# Patient Record
Sex: MALE | Race: WHITE | NOT HISPANIC OR LATINO | Employment: FULL TIME | URBAN - METROPOLITAN AREA
[De-identification: names, ages, dates, MRNs, and addresses within clinical notes are randomized per-mention and may not be internally consistent; named-entity substitution may affect disease eponyms.]

---

## 2021-10-20 ENCOUNTER — HOSPITAL ENCOUNTER (EMERGENCY)
Facility: HOSPITAL | Age: 36
Discharge: HOME | End: 2021-10-20
Attending: EMERGENCY MEDICINE
Payer: COMMERCIAL

## 2021-10-20 VITALS
DIASTOLIC BLOOD PRESSURE: 92 MMHG | HEART RATE: 96 BPM | WEIGHT: 205 LBS | TEMPERATURE: 99 F | BODY MASS INDEX: 27.8 KG/M2 | SYSTOLIC BLOOD PRESSURE: 166 MMHG | RESPIRATION RATE: 18 BRPM | OXYGEN SATURATION: 98 %
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
## 2021-10-20 PROCEDURE — 99282 EMERGENCY DEPT VISIT SF MDM: CPT | Performed by: EMERGENCY MEDICINE

## 2021-10-20 PROCEDURE — 99284 EMERGENCY DEPT VISIT MOD MDM: CPT

## 2023-04-24 ENCOUNTER — OFFICE VISIT (OUTPATIENT)
Dept: PHYSICAL THERAPY | Age: 38
End: 2023-04-24

## 2023-04-24 DIAGNOSIS — M51.27 HERNIATION OF INTERVERTEBRAL DISC BETWEEN L5 AND S1: ICD-10-CM

## 2023-04-24 DIAGNOSIS — M54.17 LUMBOSACRAL RADICULOPATHY AT S1: Primary | ICD-10-CM

## 2023-04-24 NOTE — PROGRESS NOTES
Daily Note     Today's date: 2023  Patient name: Elena Saab  : 1985  MRN: [id number]  Referring provider: Kayy Koroma MD  Dx:   Encounter Diagnosis     ICD-10-CM    1  Lumbosacral radiculopathy at S1  M54 17       2  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  M51 27                      Subjective: Patient reported lumbar spine pain is eliminated with repeated lumbar spine extension  Patient noted right medial foot paresthesias persist, but only in the L 5 dermatome  Objective: See treatment diary below  Assessment: Tolerated treatment well  Patient exhibited good technique with therapeutic exercises  Patient presents with repeated lumbar spine extension in prone as an effective pain elimination but not reducing paresthesias  But, he does present with walking improved, as well as self iadls since onset of PT and obtaining a sit to stand desktop at work  But, flexion based functional activities sustained or repeated remain producing right distal L 5 foot lateral foot region symptoms and symptoms aggravation  Thus, PT will continue to have patient perform repeated lumbar spine extension in prone with self over pressure with lock and sag technique  Patient did not have any right distal symptom reduction with use of self lumbar spine extension with out and with over pressure as well as clinician over pressure  Thus, reassessment repeated lumbar spine hep on next visit and continue with force progression in prone as well as reassess standing lumbar spine extension  Plan: Continue per plan of care  Precautions: Patient's PMHx is remarkable for GERD and Hearing loss        Manuals            Lumbar spine extension mobilization in prone grade 1&2  10 min                                                  Neuro Re-Ed                                                                                                        Ther Ex                          Treadmill walking  10 min                        Hamstring stretch:B:  20 sec x 5           LTR:B:  10 sec x 5           Piriformis stretch:B:  10 sec x 5           DLS abdominal strengthening in hook lying  NT           DLS abdominal strengthening in hook lying with hip flexion:B:  NT           DLS abdominal strengthening in hook lying with slr flexion:B:  NT           DLS opposite upper extremity to lower extremity  NT           Bridges  NT                        Prone lying 2 min hep 2 min           Prone press ups 2 x 10 hep 2 x 10            Prone press ups with lock and sag 10 x hep 2 x 10           Prone press ups with clinician over pressure  5 x 2           Mini squats with DLS             Lunges with DLS:B:                          [place] rows and extension:B:                          [employer]                                                                 Ther Activity                                       Gait Training                                       Modalities             MHP and TENS to lumbar spine while prone prn  CP to lumbar spine prone with two pillows under chest x 10 min

## 2023-04-26 ENCOUNTER — OFFICE VISIT (OUTPATIENT)
Dept: PHYSICAL THERAPY | Age: 38
End: 2023-04-26

## 2023-04-26 DIAGNOSIS — M51.27 HERNIATION OF INTERVERTEBRAL DISC BETWEEN L5 AND S1: Primary | ICD-10-CM

## 2023-04-26 DIAGNOSIS — M54.17 LUMBOSACRAL RADICULOPATHY AT S1: ICD-10-CM

## 2023-04-26 NOTE — PROGRESS NOTES
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  M51 27       2  Lumbosacral radiculopathy at S1  M54 17                      Subjective: \" I am feeling about the same, the numbness on my foot is a bit less\"       Objective: See treatment diary below  Assessment: Increased symptoms at R foot with increased LB extension ROM  Prone elevated with CP provides decreased R foot numbness  HEP prone and prone with elevation at mid range  Plan: Continue per plan of care  Precautions: Patient's PMHx is remarkable for GERD and Hearing loss        Manuals           Lumbar spine extension mobilization in prone grade 1&2  10 min 10 min                        Manual traction   Prone                           Neuro Re-Ed                                                                                                        Ther Ex                          Treadmill walking  10 min 10 min 2 0 to 2 5 mph                        R LE 90/90 floss    10x 2           Hamstring stretch:B:  20 sec x 5 20sec 5x           LTR:B:  10 sec x 5 NT                                    Prone side bend   2 min           Piriformis stretch:B:  10 sec x 5 20SEC 5X           DLS abdominal strengthening in hook lying  NT NT          DLS abdominal strengthening in hook lying with hip flexion:B:  NT NT          DLS abdominal strengthening in hook lying with slr flexion:B:  NT NT          DLS opposite upper extremity to lower extremity  NT NT          Bridges  NT NT                       Prone lying 2 min hep 2 min 2 MIN X2           Prone press ups 2 x 10 hep 2 x 10  10X 5          Prone press ups with lock and sag 10 x hep 2 x 10 10X 2          Prone press ups with clinician over pressure  5 x 2 NT          Mini squats with DLS   NT       " Lunges with DLS:B:   NT                       [place] rows and extension:B:   NT                       [employer]   NT                                                              Ther Activity                                       Gait Training                                       Modalities             MHP and TENS to lumbar spine while prone prn  CP to lumbar spine prone with two pillows under chest x 10 min With   CP     Prone with   Blue wedge   10 min

## 2023-05-01 ENCOUNTER — OFFICE VISIT (OUTPATIENT)
Dept: PHYSICAL THERAPY | Age: 38
End: 2023-05-01

## 2023-05-01 DIAGNOSIS — M51.27 HERNIATION OF INTERVERTEBRAL DISC BETWEEN L5 AND S1: Primary | ICD-10-CM

## 2023-05-01 DIAGNOSIS — M54.17 LUMBOSACRAL RADICULOPATHY AT S1: ICD-10-CM

## 2023-05-01 NOTE — PROGRESS NOTES
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  M51 27       2  Lumbosacral radiculopathy at S1  M54 17                      Subjective: Pt noted increased LB centralized pain and R toe nimbleness       Objective: See treatment diary below  1:1 Muriel Walker DPT       Assessment: No change in symptoms  Trial of TENS and MHP with no change in symptoms today        Plan: Continue per plan of care  Precautions: Patient's PMHx is remarkable for GERD and Hearing loss        Manuals          Lumbar spine extension mobilization in prone grade 1&2  10 min 10 min  NT                      TRIAL  Rot mob                                        Manual traction   Prone                           Neuro Re-Ed                                       Ther Ex                          Treadmill walking  10 min 10 min 2 0 to 2 5 mph  10 min                      R LE 90/90 floss    10x 2  20X          Hamstring stretch:B:  20 sec x 5 20sec 5x  NT         LTR:B:  10 sec x 5 NT NT                      Prone sidebend lying                          Side glides                           Prone side bend   2 min  NT         Piriformis stretch:B:  10 sec x 5 20SEC 5X  20SEC 5X          DLS abdominal strengthening in hook lying  NT NT NT         DLS abdominal strengthening in hook lying with hip flexion:B:  NT NT 20X         DLS abdominal strengthening in hook lying with slr flexion:B:  NT NT 20X         DLS opposite upper extremity to lower extremity  NT NT NT         Bridges  NT NT 20X                                                              Prone lying 2 min hep 2 min 2 MIN X2  3 MIN   3X          Prone with 1 pillow     2 MIN 2X                       Prone on wedge     NT                      Prone press ups 2 x 10 hep 2 x 10 10X 5 NT         Prone press ups with lock and sag 10 x hep 2 x 10 10X 2 NT         Prone press ups with clinician over pressure  5 x 2 NT NT         Mini squats with DLS   NT NT         Lunges with DLS:B:   NT NT                      [place] rows and extension:B:   NT NT                      [place] [employer]   NT NT                                                             Ther Activity                                       Gait Training                                       Modalities             MHP and TENS to lumbar spine while prone prn  CP to lumbar spine prone with two pillows under chest x 10 min With   CP     Prone with   Blue wedge   10 min  TENS   MHP    Prone   Laying     10 min

## 2023-05-03 ENCOUNTER — OFFICE VISIT (OUTPATIENT)
Dept: PHYSICAL THERAPY | Age: 38
End: 2023-05-03

## 2023-05-03 DIAGNOSIS — M51.27 HERNIATION OF INTERVERTEBRAL DISC BETWEEN L5 AND S1: Primary | ICD-10-CM

## 2023-05-03 DIAGNOSIS — M54.17 LUMBOSACRAL RADICULOPATHY AT S1: ICD-10-CM

## 2023-05-03 NOTE — PROGRESS NOTES
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  M51 27       2  Lumbosacral radiculopathy at S1  M54 17                      Subjective: Patient reported right lumbar spine pinching type pain and right dorsal superior 5 th ray region numbleness       Objective: See treatment diary below  Assessment: Patient presents with short term lumbar spine pain elimination but he lacks right foot paresthesia elimination  Patient's directional preference is repeated lumbar spine extension in lying, but lack of full extension mobility limits prolonged pain resolution and functional progress  Thus, PT is warranted to facilitate long term pain resolution and functional progress  Plan: Continue per plan of care  Precautions: Patient's PMHx is remarkable for GERD and Hearing loss        Manuals 4/19 4/24 4/26 5/1 5/3        Lumbar spine extension mobilization in prone grade 1&2  10 min 10 min  NT 10 min                     TRIAL  Rot mob                                        Manual traction   Prone                           Neuro Re-Ed                                       Ther Ex                          Treadmill walking  10 min 10 min 2 0 to 2 5 mph  10 min 10 min                     R LE 90/90 floss    10x 2  20X          Hamstring stretch:B:  20 sec x 5 20sec 5x  NT 10 sec x 5        LTR:B:  10 sec x 5 NT NT 10 sec x 5                     Prone sidebend lying                          Side glides                           Prone side bend   2 min  NT         Piriformis stretch:B:  10 sec x 5 20SEC 5X  20SEC 5X  10 sec x 5        DLS abdominal strengthening in hook lying  NT NT NT NT        DLS abdominal strengthening in hook lying with hip flexion:B:  NT NT 20X 2 x 10        DLS abdominal strengthening in hook lying with slr flexion:B:  NT NT 20X NT        DLS opposite upper extremity to lower extremity  NT NT NT NT        Bridges  NT NT 20X  2 x 10                                                            Prone lying 2 min hep 2 min 2 MIN X2  3 MIN   3X  3 min with 1 pillow        Prone with 1 pillow     2 MIN 2X  2 min with 2 pillow and 2 min with 3 pillows                     Prone on wedge     NT NT                     Prone press ups 2 x 10 hep 2 x 10  10X 5 NT 10 x 5        Prone press ups with lock and sag 10 x hep 2 x 10 10X 2 NT NT        Prone press ups with clinician over pressure  5 x 2 NT NT NT        Mini squats with DLS   NT NT NT        Lunges with DLS:B:   NT NT NT                     [place] rows and extension:B:   NT NT NT                     [employer]   NT NT NT                                                            Ther Activity                                       Gait Training                                       Modalities             MHP and TENS to lumbar spine while prone prn  CP to lumbar spine prone with two pillows under chest x 10 min With   CP     Prone with   Blue wedge   10 min  TENS   MHP    Prone   Laying     10 min  deferred

## 2023-05-08 ENCOUNTER — OFFICE VISIT (OUTPATIENT)
Dept: PHYSICAL THERAPY | Age: 38
End: 2023-05-08

## 2023-05-08 DIAGNOSIS — M54.17 LUMBOSACRAL RADICULOPATHY AT S1: ICD-10-CM

## 2023-05-08 DIAGNOSIS — M51.27 HERNIATION OF INTERVERTEBRAL DISC BETWEEN L5 AND S1: Primary | ICD-10-CM

## 2023-05-08 NOTE — PROGRESS NOTES
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 on the same day without pinching sensation  Patient reported prolonged sitting at work will aggravate right distal lateral paresthesias  Objective: See treatment diary below  Assessment: Patient presents with short term lumbar spine pain elimination with repeated lumbar spine extension with and without over pressure  Patient continues to exhibit lack of right foot paresthesia elimination with piriformis stretching today creating right foot paresthesias aggravation  Thus, PT is warranted to facilitate long term pain resolution and functional progress  Plan: Continue per plan of care  Precautions: Patient's PMHx is remarkable for GERD and Hearing loss        Manuals 4/19 4/24 4/26 5/1 5/3 5/8       Lumbar spine extension mobilization in prone grade 1&2  10 min 10 min  NT 10 min 10 min continue                   TRIAL  Rot mob                                        Manual traction   Prone                           Neuro Re-Ed                                       Ther Ex                          Treadmill walking  10 min 10 min 2 0 to 2 5 mph  10 min 10 min                     R LE 90/90 floss    10x 2  20X          Hamstring stretch:B:  20 sec x 5 20sec 5x  NT 10 sec x 5 10 sec x 5       LTR:B:  10 sec x 5 NT NT 10 sec x 5 10 sec x 5                    Prone sidebend lying                          Side glides                           Prone side bend   2 min  NT         Piriformis stretch:B:  10 sec x 5 20SEC 5X  20SEC 5X  10 sec x 5 10 sec x 5       DLS abdominal strengthening in hook lying  NT NT NT NT NT       DLS abdominal strengthening in hook lying with hip flexion:B:  NT NT 20X 2 x 10 NT       DLS abdominal strengthening in hook lying with slr flexion:B:  NT NT 20X NT NT       DLS opposite upper extremity to lower extremity  NT NT NT NT NT       Bridges  NT NT 20X  2 x 10 2 x 10                                                           Prone lying 2 min hep 2 min 2 MIN X2  3 MIN   3X  3 min with 1 pillow 3 min with 1 pillow, 3 min without pillow       Prone with 1 pillow     2 MIN 2X  2 min with 2 pillow and 2 min with 3 pillows                     Prone on wedge     NT NT NT                    Prone press ups 2 x 10 hep 2 x 10  10X 5 NT 10 x 5 10 x pre and post treatment session       Prone press ups with lock and sag 10 x hep 2 x 10 10X 2 NT NT 10 x 2 pre and post PT treatment session       Prone press ups with clinician over pressure  5 x 2 NT NT NT  assess      Mini squats with DLS   NT NT NT NT       Lunges with DLS:B:   NT NT NT NT                    [place] rows and extension:B:   NT NT NT NT                    [place] [employer]   NT NT NT NT                                                           Ther Activity                                       Gait Training                                       Modalities             MHP and TENS to lumbar spine while prone prn  CP to lumbar spine prone with two pillows under chest x 10 min With   CP     Prone with   Blue wedge   10 min  TENS   MHP    Prone   Laying     10 min  deferred   CP x 10 min prone

## 2023-05-10 ENCOUNTER — OFFICE VISIT (OUTPATIENT)
Dept: PHYSICAL THERAPY | Age: 38
End: 2023-05-10

## 2023-05-10 DIAGNOSIS — M54.17 LUMBOSACRAL RADICULOPATHY AT S1: ICD-10-CM

## 2023-05-10 DIAGNOSIS — M51.27 HERNIATION OF INTERVERTEBRAL DISC BETWEEN L5 AND S1: Primary | ICD-10-CM

## 2023-05-10 NOTE — PROGRESS NOTES
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  M51 27       2  Lumbosacral radiculopathy at S1  M54 17                      Subjective: Pt reported 1-2/10 LB \"R foot pinky toe numbness the same\"       Objective: See treatment diary below  Assessment: LB \"pinching\" with  and loc and sag increased R foot toe numbness and tingling  TENS and CP Provided relief of overall symptoms  Plan: Continue per plan of care  Precautions: Patient's PMHx is remarkable for GERD and Hearing loss        Manuals 4/19 4/24 4/26 5/1 5/3 5/8 5/10      Lumbar spine extension mobilization in prone grade 1&2  10 min 10 min  NT 10 min 10 min  10 min                    TRIAL  Rot mob                           Manual traction   Prone                           Neuro Re-Ed                          Ther Ex                          Treadmill walking  10 min 10 min 2 0 to 2 5 mph  10 min 10 min 10 min      10 MIN                            R LE 90/90 floss    10x 2  20X     20X       Hamstring stretch:B:  20 sec x 5 20sec 5x  NT 10 sec x 5  HOLD      LTR:B:  10 sec x 5 NT NT 10 sec x 5  20X 3SEC                    Prone sidebend lying       NT                   Side glides        NT                   Prone side bend   2 min  NT   NT      Piriformis stretch:B:  10 sec x 5 20SEC 5X  20SEC 5X  10 sec x 5 10 sec x 5 20SEC 5X       DLS abdominal strengthening in hook lying  NT NT NT NT NT 20X       DLS abdominal strengthening in hook lying with hip flexion:B:  NT NT 20X 2 x 10 NT 20X       DLS abdominal strengthening in hook lying with slr flexion:B:  NT NT 20X NT NT NT      DLS opposite upper extremity to lower extremity  NT NT NT NT NT NT      Bridges  NT NT 20X  2 x 10 2 x 10 20x                                                           Prone " lying 2 min hep 2 min 2 MIN X2  3 MIN   3X  3 min with 1 pillow 3 min with 1 pillow, 3 min without pillow 3 MIN     1 PILLOW       Prone with 1 pillow     2 MIN 2X  2 min with 2 pillow and 2 min with 3 pillows  2 MIN     No pillow                    Prone on wedge     NT NT NT NT                   Prone press ups 2 x 10 hep 2 x 10  10X 5 NT 10 x 5 10 x pre and post treatment session 20X       Prone press ups with lock and sag 10 x hep 2 x 10 10X 2 NT NT 10 x 2 pre and post PT treatment session 20X       Prone press ups with clinician over pressure  5 x 2 NT NT NT  assess      Mini squats with DLS   NT NT NT NT NT      Lunges with DLS:B:   NT NT NT NT NT                   [place] rows and extension:B:   NT NT NT NT NT                   [employer]   NT NT NT NT NT                                                          Ther Activity                                       Gait Training                                       Modalities             MHP and TENS to lumbar spine while prone prn  CP to lumbar spine prone with two pillows under chest x 10 min With   CP     Prone with   Blue wedge   10 min  TENS   MHP    Prone   Laying     10 min  deferred   CP x 10 min prone CP   And TENS     To LB   Prone

## 2023-05-15 ENCOUNTER — OFFICE VISIT (OUTPATIENT)
Dept: PHYSICAL THERAPY | Age: 38
End: 2023-05-15

## 2023-05-15 DIAGNOSIS — M54.17 LUMBOSACRAL RADICULOPATHY AT S1: ICD-10-CM

## 2023-05-15 DIAGNOSIS — M51.27 HERNIATION OF INTERVERTEBRAL DISC BETWEEN L5 AND S1: Primary | ICD-10-CM

## 2023-05-15 NOTE — LETTER
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  M51 27       2  Lumbosacral radiculopathy at S1  M54 17           Dear Dr Corinna Sullivan: Thank you for your recent referral of Everardo Garcia  Please review the attached evaluation summary from Horacio's recent visit  Please verify that you agree with the plan of care by signing the attached order  If you have any questions or concerns, please do not hesitate to call  I sincerely appreciate the opportunity to share in the care of one of your patients and hope to have another opportunity to work with you in the near future  Sincerely,    Paul Ott, PT      Referring Provider:      I certify that I have read the below Plan of Care and certify the need for these services furnished under this plan of treatment while under my care  Ame Mejía MD  [address]  [phone]  Via Fax: [phone]          PT Evaluation  / PT Reassessment    Today's date: 5/15/2023  Patient name: Everardo Garcia  : 1985  MRN: [id number]  Referring provider: Ashwin Kam MD  Dx:   Encounter Diagnosis     ICD-10-CM    1  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  M51 27       2  Lumbosacral radiculopathy at S1  M54 17                      Assessment  Assessment details: PT Reassessment: 05/15/23  Patient reported the following progress since onset of PT: no longer having right distal lower extremity numbness, sitting, driving improved and no longer having lumbar spine muscle spasms    But, he noted the following deficits that still persists: intermittent lumbar spine pinching sensation with prolonged sitting and awkward posture, bending, lifting activities > 20# stair climbing and dressing right lower extremity  PT IE: 04/19/23  Patient reported right lumbar spine and low back, along with new symptoms after twisting his lumbar spine which is half of foot numbness, ankle weakness, tightness of the gastrocnemius / posterior knee region  Plus, he noted when he bends forwards his right lower extremity symptoms are worsened  Patient noted   Patient noted L5/S1severe HNP  Patient noted his prior injury was after a fall, many years ago  Patient noted he is looking forward to not having surgery  Patient noted his right lower extremity is weaker  Patient noted he has the posterior of his foot is on fire  Patient noted he has the following deficits due to lumbar spine and right le symptoms: dressing, tying his shoe, lifting, bending, sitting, squatting, sleep deprived with prone and right side lying positions pain aggravating  Patient reported walking and standing are better  Patient noted all bending activities are limited by right lower extremity symptoms aggravation  Patient noted sitting in his car is pain aggravating  Patient noted his right lower extremity is weaker  Patient noted when this first happened, the twisting episode, he went to the ED  Patient noted he has trailed dexamethasone, naproxen and a muscle relaxant which has helped a little, but not long term  Patient noted right foot numbness since twisting was been constant  Impairments: abnormal gait, abnormal or restricted ROM, abnormal movement, activity intolerance, impaired physical strength, lacks appropriate home exercise program and pain with function  Understanding of Dx/Px/POC: excellent   Prognosis: good  Prognosis details: Patient is a 42y o  year old male seen for outpatient PT reevaluation with pain, mobility and functional deficits due to lumbar radiculopathy and HNP at L5 S1   Patient preesnts with the following progress since onset of PT: decrease in lumbar spine and right distal lower extremity pain and paresthesias, decrease in muscle guarding, ambulation, lumbar spine mobility, right lower extremity mobility and strength and functional progress  Patient presents to PT reassessment with the following problems, concerns, deficits and impairments: lumbar spine, right lower extremity pain and paresthesias, decreased lumbar spine range of motion, decreased right lower extremity mobility and strength, gait and stair dysfunctions, transfer deficits, + muscle guarding, + special tests, decrease in postural awareness, functional limitations and decreased tolerance to activity  Patient would benefit from skilled PT services under the following PT treatment plan to address the above noted deficits: therapeutic exercises and activities to facilitate lumbar spine mobility and bilateral lower extremity mobility and strength, modalities, manual therapy techniques, postural reeducation and strengthening, DLS and abdominal strengthening, gait and stair training, transfer training, balance and proprioception activities, IASTM techniques, Kinesio taping techniques and a hep  Thank you for the referral      Goals  Short Term goals - 4 weeks  1  Patient will be independent HEP  MET  2   Patient will report a 25 - 50% decrease in pain complaints  MET  3   Increase strength 1/2 grade  MET  4   Increase ROM 5-10 degrees  MET  Long Term goals - 8 weeks  1  Patient will report elimination of pain complaints  Partially MET  2   Patient will return to all work related activities without restriction  Partially MET  3   Patient will return to all recreational activities without restriction  Partially MET  4   ROM WFL  Partially MET  5   Strength 5/5  Partially MET  6   Patient will report ability to perform right distal lower extremity dressing and shoe donning and doffing without lumbar spine or left lower extremity symptoms or limitations  MET  7   Patient will deny walking and stair climbing deficits  MET    8   Patient will deny transfer deficits  Partially MET  9   Patient will report ability to resume house hold lifting and bending based functional activities without lumbar spine or right lower extremity symptoms or limitations  Partially MET  10   Patient will report ability to sit for > 15 min prior to lumbar spine or right lower extremity symptoms or limitations  Partially MET  11   Patient will report sleep improved by > 30 minutes prior to lumbar spine or right lower extremity symptoms or limitations  MET  Plan  Patient would benefit from: skilled physical therapy  Planned modality interventions: cryotherapy, TENS, thermotherapy: hydrocollator packs, traction, ultrasound and unattended electrical stimulation  Planned therapy interventions: joint mobilization, manual therapy, massage, balance, balance/weight bearing training, body mechanics training, patient education, postural training, muscle pump exercises, neuromuscular re-education, compression, self care, strengthening, stretching, therapeutic activities, therapeutic exercise, therapeutic training, transfer training, flexibility, functional ROM exercises, gait training, graded activity, graded exercise, graded motor and home exercise program  Frequency: 2x week  Duration in weeks: 8  Treatment plan discussed with: patient        Subjective Evaluation    History of Present Illness  Mechanism of injury: Patient's PMHx is remarkable for GERD, Right ankle ORIF and Hearing loss  Pain  At best pain ratin  At worst pain rating: 3  Location: right lumbar spine region     Patient Goals  Patient goals for therapy: decreased pain, increased motion, increased strength, independence with ADLs/IADLs and return to sport/leisure activities          Objective     Tenderness     Additional Tenderness Details  Patient was - TTP but + moderate to severe muscle guarding at lumbar spine erector spinae musculature    Patient exhibited a decrease in lumbar spine lordosis due to severe muscle guarding  Active Range of Motion     Lumbar   Flexion: 62 degrees  with pain  Extension: 26 degrees   Left lateral flexion: 20 degrees    with pain  Right lateral flexion: 20 degrees  with pain  Left rotation: 52 degrees   Right rotation: 58 degrees   Left Hip   Flexion: 116 degrees     Right Hip   Flexion: 112 degrees     Additional Active Range of Motion Details  Hamstring mobility on right at 36 degrees and left at 40 degrees  Mechanical Assessment    Cervical      Thoracic      Lumbar    Sitting flexion:   Pain location: peripheralized  Pain intensity: worse  Pain level: increased  Standing extension: repeated movements  Pain location: peripheralized  Pain intensity: worse  Pain level: increased  Lying extension: repeated movements  Pain intensity: better  Pain level: decreased    Strength/Myotome Testing     Left Hip   Planes of Motion   Flexion: 5  Extension: 5  Abduction: 5  Adduction: 5    Right Hip   Planes of Motion   Flexion: 4  Extension: 4+  Abduction: 5  Adduction: 5    Left Knee   Flexion: 5  Extension: 5    Right Knee   Flexion: 5  Extension: 4+    Left Ankle/Foot   Dorsiflexion: 5  Plantar flexion: 5    Right Ankle/Foot   Dorsiflexion: 5  Plantar flexion: 5    Tests     Lumbar     Left   Positive crossed SLR  Right   Positive passive SLR and slump test      Right Pelvic Girdle/Sacrum   Positive: active SLR test      Right Hip   SLR: Positive  Ambulation     Ambulation: Level Surfaces   Ambulation without assistive device: independent    Additional Level Surfaces Ambulation Details  Patient ambulates with decrease in right stance and left swing phase  Ambulation: Stairs   Ascend stairs: independent  Pattern: reciprocal  Railings: two rails  Descend stairs: independent  Pattern: non-reciprocal  Railings: two rails                 Precautions: Patient's PMHx is remarkable for GERD and Hearing loss        Manuals 4/19 4/24 4/26 5/1 5/3 5/8 5/10 5/15     Lumbar spine extension mobilization in prone grade 1&2  10 min 10 min  NT 10 min 10 min  10 min  10 min                  TRIAL  Rot mob                           Manual traction   Prone                           Neuro Re-Ed                          Ther Ex                          Treadmill walking  10 min 10 min 2 0 to 2 5 mph  10 min 10 min 10 min      10 MIN          10 min                  R LE 90/90 floss    10x 2  20X     20X  resume next visit resume today    Hamstring stretch:B:  20 sec x 5 20sec 5x  NT 10 sec x 5  HOLD Hold D/C    LTR:B:  10 sec x 5 NT NT 10 sec x 5  20X 3SEC  10 sec x 5                  Prone sidebend lying       NT NT                  Side glides        NT NT                  Prone side bend   2 min  NT   NT NT     Piriformis stretch:B:  10 sec x 5 20SEC 5X  20SEC 5X  10 sec x 5 10 sec x 5 20SEC 5X  20 sec x 5     DLS abdominal strengthening in hook lying  NT NT NT NT NT 20X  2 x 10     DLS abdominal strengthening in hook lying with hip flexion:B:  NT NT 20X 2 x 10 NT 20X  2 x 10     DLS abdominal strengthening in hook lying with slr flexion:B:  NT NT 20X NT NT NT NT     DLS opposite upper extremity to lower extremity  NT NT NT NT NT NT NT     Bridges  NT NT 20X  2 x 10 2 x 10 20x  2 x 10                                                         Prone lying 2 min hep 2 min 2 MIN X2  3 MIN   3X  3 min with 1 pillow 3 min with 1 pillow, 3 min without pillow 3 MIN     1 PILLOW  3 min 2 pillows under shoulders / chest     Prone with 1 pillow     2 MIN 2X  2 min with 2 pillow and 2 min with 3 pillows  2 MIN     No pillow  NT                  Prone on wedge     NT NT NT NT NT                  Prone press ups 2 x 10 hep 2 x 10  10X 5 NT 10 x 5 10 x pre and post treatment session 20X  10 x pre session and post session      Prone press ups with lock and sag 10 x hep 2 x 10 10X 2 NT NT 10 x 2 pre and post PT treatment session 20X  2 x 10     Prone press ups with clinician over pressure  5 x 2 NT NT NT  assess hold     Mini squats with DLS   NT NT NT NT NT NT     Lunges with DLS:B:   NT NT NT NT NT NT                  [place] rows and extension:B:   NT NT NT NT NT NT                  [place] Pal[place] Press   NT NT NT NT NT NT                                                         Ther Activity                                       Gait Training                                       Modalities             MHP and TENS to lumbar spine while prone prn  CP to lumbar spine prone with two pillows under chest x 10 min With   CP     Prone with   Blue wedge   10 min  TENS   MHP    Prone   Laying     10 min  deferred   CP x 10 min prone CP   And TENS     To LB   Prone  CP and Zynex TENS x 10 min prone

## 2023-05-15 NOTE — PROGRESS NOTES
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  M51 27       2  Lumbosacral radiculopathy at S1  M54 17                      Assessment  Assessment details: PT Reassessment: 05/15/23  Patient reported the following progress since onset of PT: no longer having right distal lower extremity numbness, sitting, driving improved and no longer having lumbar spine muscle spasms  But, he noted the following deficits that still persists: intermittent lumbar spine pinching sensation with prolonged sitting and awkward posture, bending, lifting activities > 20# stair climbing and dressing right lower extremity  PT IE: 23  Patient reported right lumbar spine and low back, along with new symptoms after twisting his lumbar spine which is half of foot numbness, ankle weakness, tightness of the gastrocnemius / posterior knee region  Plus, he noted when he bends forwards his right lower extremity symptoms are worsened  Patient noted   Patient noted L5/S1severe HNP  Patient noted his prior injury was after a fall, many years ago  Patient noted he is looking forward to not having surgery  Patient noted his right lower extremity is weaker  Patient noted he has the posterior of his foot is on fire  Patient noted he has the following deficits due to lumbar spine and right le symptoms: dressing, tying his shoe, lifting, bending, sitting, squatting, sleep deprived with prone and right side lying positions pain aggravating  Patient reported walking and standing are better  Patient noted all bending activities are limited by right lower extremity symptoms aggravation  Patient noted sitting in his car is pain aggravating  Patient noted his right lower extremity is weaker    Patient noted when this first happened, the twisting episode, he went to the ED  Patient noted he has trailed dexamethasone, naproxen and a muscle relaxant which has helped a little, but not long term  Patient noted right foot numbness since twisting was been constant  Impairments: abnormal gait, abnormal or restricted ROM, abnormal movement, activity intolerance, impaired physical strength, lacks appropriate home exercise program and pain with function  Understanding of Dx/Px/POC: excellent   Prognosis: good  Prognosis details: Patient is a 42y o  year old male seen for outpatient PT reevaluation with pain, mobility and functional deficits due to lumbar radiculopathy and HNP at L5 S1  Patient preesnts with the following progress since onset of PT: decrease in lumbar spine and right distal lower extremity pain and paresthesias, decrease in muscle guarding, ambulation, lumbar spine mobility, right lower extremity mobility and strength and functional progress  Patient presents to PT reassessment with the following problems, concerns, deficits and impairments: lumbar spine, right lower extremity pain and paresthesias, decreased lumbar spine range of motion, decreased right lower extremity mobility and strength, gait and stair dysfunctions, transfer deficits, + muscle guarding, + special tests, decrease in postural awareness, functional limitations and decreased tolerance to activity  Patient would benefit from skilled PT services under the following PT treatment plan to address the above noted deficits: therapeutic exercises and activities to facilitate lumbar spine mobility and bilateral lower extremity mobility and strength, modalities, manual therapy techniques, postural reeducation and strengthening, DLS and abdominal strengthening, gait and stair training, transfer training, balance and proprioception activities, IASTM techniques, Kinesio taping techniques and a hep  Thank you for the referral      Goals  Short Term goals - 4 weeks  1  Patient will be independent HEP  MET  2   Patient will report a 25 - 50% decrease in pain complaints  MET  3   Increase strength 1/2 grade  MET  4   Increase ROM 5-10 degrees  MET  Long Term goals - 8 weeks  1  Patient will report elimination of pain complaints  Partially MET  2   Patient will return to all work related activities without restriction  Partially MET  3   Patient will return to all recreational activities without restriction  Partially MET  4   ROM WFL  Partially MET  5   Strength 5/5  Partially MET  6   Patient will report ability to perform right distal lower extremity dressing and shoe donning and doffing without lumbar spine or left lower extremity symptoms or limitations  MET  7   Patient will deny walking and stair climbing deficits  MET  8   Patient will deny transfer deficits  Partially MET  9   Patient will report ability to resume house hold lifting and bending based functional activities without lumbar spine or right lower extremity symptoms or limitations  Partially MET  10   Patient will report ability to sit for > 15 min prior to lumbar spine or right lower extremity symptoms or limitations  Partially MET  11   Patient will report sleep improved by > 30 minutes prior to lumbar spine or right lower extremity symptoms or limitations  MET      Plan  Patient would benefit from: skilled physical therapy  Planned modality interventions: cryotherapy, TENS, thermotherapy: hydrocollator packs, traction, ultrasound and unattended electrical stimulation  Planned therapy interventions: joint mobilization, manual therapy, massage, balance, balance/weight bearing training, body mechanics training, patient education, postural training, muscle pump exercises, neuromuscular re-education, compression, self care, strengthening, stretching, therapeutic activities, therapeutic exercise, therapeutic training, transfer training, flexibility, functional ROM exercises, gait training, graded activity, graded exercise, graded motor and home exercise program  Frequency: 2x week  Duration in weeks: 8  Treatment plan discussed with: patient        Subjective Evaluation    History of Present Illness  Mechanism of injury: Patient's PMHx is remarkable for GERD, Right ankle ORIF and Hearing loss  Pain  At best pain ratin  At worst pain rating: 3  Location: right lumbar spine region     Patient Goals  Patient goals for therapy: decreased pain, increased motion, increased strength, independence with ADLs/IADLs and return to sport/leisure activities          Objective     Tenderness     Additional Tenderness Details  Patient was - TTP but + moderate to severe muscle guarding at lumbar spine erector spinae musculature  Patient exhibited a decrease in lumbar spine lordosis due to severe muscle guarding  Active Range of Motion     Lumbar   Flexion: 62 degrees  with pain  Extension: 26 degrees   Left lateral flexion: 20 degrees    with pain  Right lateral flexion: 20 degrees  with pain  Left rotation: 52 degrees   Right rotation: 58 degrees   Left Hip   Flexion: 116 degrees     Right Hip   Flexion: 112 degrees     Additional Active Range of Motion Details  Hamstring mobility on right at 36 degrees and left at 40 degrees    Mechanical Assessment    Cervical      Thoracic      Lumbar    Sitting flexion:   Pain location: peripheralized  Pain intensity: worse  Pain level: increased  Standing extension: repeated movements  Pain location: peripheralized  Pain intensity: worse  Pain level: increased  Lying extension: repeated movements  Pain intensity: better  Pain level: decreased    Strength/Myotome Testing     Left Hip   Planes of Motion   Flexion: 5  Extension: 5  Abduction: 5  Adduction: 5    Right Hip   Planes of Motion   Flexion: 4  Extension: 4+  Abduction: 5  Adduction: 5    Left Knee   Flexion: 5  Extension: 5    Right Knee   Flexion: 5  Extension: 4+    Left Ankle/Foot   Dorsiflexion: 5  Plantar flexion: 5    Right Ankle/Foot   Dorsiflexion: 5  Plantar flexion: 5    Tests     Lumbar     Left   Positive crossed SLR  Right   Positive passive SLR and slump test      Right Pelvic Girdle/Sacrum   Positive: active SLR test      Right Hip   SLR: Positive  Ambulation     Ambulation: Level Surfaces   Ambulation without assistive device: independent    Additional Level Surfaces Ambulation Details  Patient ambulates with decrease in right stance and left swing phase  Ambulation: Stairs   Ascend stairs: independent  Pattern: reciprocal  Railings: two rails  Descend stairs: independent  Pattern: non-reciprocal  Railings: two rails                  Precautions: Patient's PMHx is remarkable for GERD and Hearing loss        Manuals 4/19 4/24 4/26 5/1 5/3 5/8 5/10 5/15     Lumbar spine extension mobilization in prone grade 1&2  10 min 10 min  NT 10 min 10 min  10 min  10 min                  TRIAL  Rot mob                           Manual traction   Prone                           Neuro Re-Ed                          Ther Ex                          Treadmill walking  10 min 10 min 2 0 to 2 5 mph  10 min 10 min 10 min      10 MIN          10 min                  R LE 90/90 floss    10x 2  20X     20X  resume next visit resume today    Hamstring stretch:B:  20 sec x 5 20sec 5x  NT 10 sec x 5  HOLD Hold D/C    LTR:B:  10 sec x 5 NT NT 10 sec x 5  20X 3SEC  10 sec x 5                  Prone sidebend lying       NT NT                  Side glides        NT NT                  Prone side bend   2 min  NT   NT NT     Piriformis stretch:B:  10 sec x 5 20SEC 5X  20SEC 5X  10 sec x 5 10 sec x 5 20SEC 5X  20 sec x 5     DLS abdominal strengthening in hook lying  NT NT NT NT NT 20X  2 x 10     DLS abdominal strengthening in hook lying with hip flexion:B:  NT NT 20X 2 x 10 NT 20X  2 x 10     DLS abdominal strengthening in hook lying with slr flexion:B:  NT NT 20X NT NT NT NT     DLS opposite upper extremity to lower extremity  NT NT NT NT NT NT NT     Bridges  NT NT 20X  2 x 10 2 x 10 20x  2 x 10                                                         Prone lying 2 min hep 2 min 2 MIN X2  3 MIN   3X  3 min with 1 pillow 3 min with 1 pillow, 3 min without pillow 3 MIN     1 PILLOW  3 min 2 pillows under shoulders / chest     Prone with 1 pillow     2 MIN 2X  2 min with 2 pillow and 2 min with 3 pillows  2 MIN     No pillow  NT                  Prone on wedge     NT NT NT NT NT                  Prone press ups 2 x 10 hep 2 x 10  10X 5 NT 10 x 5 10 x pre and post treatment session 20X  10 x pre session and post session      Prone press ups with lock and sag 10 x hep 2 x 10 10X 2 NT NT 10 x 2 pre and post PT treatment session 20X  2 x 10     Prone press ups with clinician over pressure  5 x 2 NT NT NT  assess hold     Mini squats with DLS   NT NT NT NT NT NT     Lunges with DLS:B:   NT NT NT NT NT NT                  [place] rows and extension:B:   NT NT NT NT NT NT                  [place] Paloff Press   NT NT NT NT NT NT                                                         Ther Activity                                       Gait Training                                       Modalities             MHP and TENS to lumbar spine while prone prn  CP to lumbar spine prone with two pillows under chest x 10 min With   CP     Prone with   Blue wedge   10 min  TENS   MHP    Prone   Laying     10 min  deferred   CP x 10 min prone CP   And TENS     To LB   Prone  CP and Zynex TENS x 10 min prone

## 2023-05-17 ENCOUNTER — OFFICE VISIT (OUTPATIENT)
Dept: PHYSICAL THERAPY | Age: 38
End: 2023-05-17

## 2023-05-17 DIAGNOSIS — M54.17 LUMBOSACRAL RADICULOPATHY AT S1: ICD-10-CM

## 2023-05-17 DIAGNOSIS — M51.27 HERNIATION OF INTERVERTEBRAL DISC BETWEEN L5 AND S1: Primary | ICD-10-CM

## 2023-05-17 NOTE — PROGRESS NOTES
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  M51 27       2  Lumbosacral radiculopathy at S1  M54 17                      Subjective: Patient reported minimal to moderate pinching type pain in lumbar spine, but he denies right lateral 5 th ray region paresthesias are not present currently  Objective: See treatment diary below  Assessment: Patient presents with lumbar spine mobility deficits due to pinching type sensation that limits long term pain reduction and right distal lower extremity symptom resolution  Thus, PT is warranted to facilitate full lumbar spine extension mobility without pinching type sensation and long term right distal lower extremity symptom resolution to promote full functional progress  Assess standing based therapeutic exercises and repeated lumbar spine extension for long term symptom reduction  Plan: Continue per plan of care  Precautions: Patient's PMHx is remarkable for GERD and Hearing loss  Hold all slr based hamstring stretching and active slr flexion based movements due to creating peripheralization        Manuals  5 5/3 5/8 5/10 5/15 5/17        Lumbar spine extension mobilization in prone grade 1&2  10 min 10 min  NT 10 min 10 min  10 min  10 min 10 min                         TRIAL  Rot mob                                   Manual traction   Prone                                   Neuro Re-Ed                                  Ther Ex                                  Treadmill walking  10 min 10 min 2 0 to 2 5 mph  10 min 10 min 10 min      10 MIN          10 min 10 min                         R LE 90/90 floss    10x 2  20X     20X  resume next visit resume today        LTR:B:  10 sec x 5 NT NT 10 sec x 5  20X 3SEC  10 sec x 5 10 sec x 5 Prone sidebend lying       NT NT NT                         Side glides        NT NT NT                         Prone side bend   2 min  NT   NT NT NT        Piriformis stretch:B:  10 sec x 5 20SEC 5X  20SEC 5X  10 sec x 5 10 sec x 5 20SEC 5X  20 sec x 5 20 sec x 5        DLS abdominal strengthening in hook lying  NT NT NT NT NT 20X  2 x 10 NT        DLS abdominal strengthening in hook lying with hip flexion:B:  NT NT 20X 2 x 10 NT 20X  2 x 10 NT        DLS abdominal strengthening in hook lying with slr flexion:B:  NT NT 20X NT NT NT NT Hold!         DLS opposite upper extremity to lower extremity  NT NT NT NT NT NT NT NT        Bridges  NT NT 20X  2 x 10 2 x 10 20x  2 x 10                                                                             Prone lying 2 min hep 2 min 2 MIN X2  3 MIN   3X  3 min with 1 pillow 3 min with 1 pillow, 3 min without pillow 3 MIN     1 PILLOW  3 min 2 pillows under shoulders / chest 3 min 2 pillows under shoulders / chest                         Prone on wedge     NT NT NT NT NT NT                         Prone press ups 2 x 10 hep 2 x 10  10X 5 NT 10 x 5 10 x pre and post treatment session 20X  10 x pre session and post session  10 x pre and post session        Prone press ups with lock and sag 10 x hep 2 x 10 10X 2 NT NT 10 x 2 pre and post PT treatment session 20X  2 x 10 2 x 10 pre session        Prone press ups with clinician over pressure  5 x 2 NT NT NT  assess hold NT        Mini squats with DLS   NT NT NT NT NT NT NT assess       Lunges with DLS:B:   NT NT NT NT NT NT NT assess                        [place] rows and extension:B:   NT NT NT NT NT NT NT assess                        [place] [employer]   NT NT NT NT NT NT NT                                                                            Ther Activity                                                   Gait Training                                                   Modalities                 [facility] and TENS to lumbar spine while prone prn  CP to lumbar spine prone with two pillows under chest x 10 min With   CP     Prone with   Blue wedge   10 min  TENS   MHP    Prone   Laying     10 min  deferred   CP x 10 min prone CP   And TENS     To LB   Prone  MHP only x 10 min prone

## 2023-05-22 ENCOUNTER — OFFICE VISIT (OUTPATIENT)
Dept: PHYSICAL THERAPY | Age: 38
End: 2023-05-22

## 2023-05-22 DIAGNOSIS — M54.17 LUMBOSACRAL RADICULOPATHY AT S1: ICD-10-CM

## 2023-05-22 DIAGNOSIS — M51.27 HERNIATION OF INTERVERTEBRAL DISC BETWEEN L5 AND S1: Primary | ICD-10-CM

## 2023-05-22 NOTE — PROGRESS NOTES
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  M51 27       2  Lumbosacral radiculopathy at S1  M54 17                      Subjective: the numbess in the foot is completely gone for the first time since aggravating his sx  Objective: See treatment diary below      Assessment: Tolerated treatment well, continued positive response to extension program  Very good tolerance to new tasks, but with frequent cuing for form, some apprehension with mini-squats  Reintroduced nerve flossing, without adverse effects  Irritation of sx with R piriformis stretching, recreating paresthesias, reduction with PPU  Continued PT would be beneficial to improve function           Plan: Continue per plan of care  Precautions: Patient's PMHx is remarkable for GERD and Hearing loss  Hold all slr based hamstring stretching and active slr flexion based movements due to creating peripheralization        Manuals 4/19 4/24 4/26 5/1 5/3 5/8 5/10 5/15 5/17 5/22       Lumbar spine extension mobilization in prone grade 1&2  10 min 10 min  NT 10 min 10 min  10 min  10 min 10 min                         TRIAL  Rot mob                                   Manual traction   Prone                                   Neuro Re-Ed                                  Ther Ex                                  Treadmill walking  10 min 10 min 2 0 to 2 5 mph  10 min 10 min 10 min      10 MIN          10 min 10 min 10 min                        R LE 90/90 floss    10x 2  20X     20X  resume next visit resume today 2x10       LTR:B:  10 sec x 5 NT NT 10 sec x 5  20X 3SEC  10 sec x 5 10 sec x 5 10 sec x 5                        Prone sidebend lying       NT NT NT                         Side glides        NT NT NT                         Prone side bend   2 min  NT NT NT NT        Piriformis stretch:B:  10 sec x 5 20SEC 5X  20SEC 5X  10 sec x 5 10 sec x 5 20SEC 5X  20 sec x 5 20 sec x 5 20 sec x 5 Hold? DLS abdominal strengthening in hook lying  NT NT NT NT NT 20X  2 x 10 NT        DLS abdominal strengthening in hook lying with hip flexion:B:  NT NT 20X 2 x 10 NT 20X  2 x 10 NT        DLS abdominal strengthening in hook lying with slr flexion:B:  NT NT 20X NT NT NT NT Hold!         DLS opposite upper extremity to lower extremity  NT NT NT NT NT NT NT NT        Bridges  NT NT 20X  2 x 10 2 x 10 20x  2 x 10                                                                             Prone lying 2 min hep 2 min 2 MIN X2  3 MIN   3X  3 min with 1 pillow 3 min with 1 pillow, 3 min without pillow 3 MIN     1 PILLOW  3 min 2 pillows under shoulders / chest 3 min 2 pillows under shoulders / chest 3 min 2 pillows under shoulders / chest                        Prone on wedge     NT NT NT NT NT NT NT                        Prone press ups 2 x 10 hep 2 x 10  10X 5 NT 10 x 5 10 x pre and post treatment session 20X  10 x pre session and post session  10 x pre and post session 10 x pre and post session       Prone press ups with lock and sag 10 x hep 2 x 10 10X 2 NT NT 10 x 2 pre and post PT treatment session 20X  2 x 10 2 x 10 pre session 2 x 10 pre session       Prone press ups with clinician over pressure  5 x 2 NT NT NT  assess hold NT        Mini squats with DLS   NT NT NT NT NT NT NT 2x10       Lunges with DLS:B:   NT NT NT NT NT NT NT assess                        [place] rows and extension:B:   NT NT NT NT NT NT NT 2x10 ea 11#                        [place] [employer]   NT NT NT NT NT NT NT                                                                            Ther Activity                                                   Gait Training                                                   Modalities                 MHP and TENS to lumbar spine while prone prn  CP to lumbar spine prone with two pillows under chest x 10 min With   CP     Prone with   Blue wedge   10 min  TENS   MHP    Prone   Laying     10 min  deferred   CP x 10 min prone CP   And TENS     To LB   Prone  MHP only x 10 min prone  deferred

## 2023-05-24 ENCOUNTER — APPOINTMENT (OUTPATIENT)
Dept: PHYSICAL THERAPY | Age: 38
End: 2023-05-24
Payer: COMMERCIAL

## 2023-05-26 ENCOUNTER — OFFICE VISIT (OUTPATIENT)
Dept: PHYSICAL THERAPY | Age: 38
End: 2023-05-26

## 2023-05-26 DIAGNOSIS — M54.17 LUMBOSACRAL RADICULOPATHY AT S1: ICD-10-CM

## 2023-05-26 DIAGNOSIS — M51.27 HERNIATION OF INTERVERTEBRAL DISC BETWEEN L5 AND S1: Primary | ICD-10-CM

## 2023-05-26 NOTE — PROGRESS NOTES
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  M51 27       2  Lumbosacral radiculopathy at S1  M54 17                      Subjective: Pt reported no LB symptoms upon arrival today       Objective: See treatment diary below      Assessment: Abolished symptoms after today's session  Plan: Continue per plan of care  Precautions: Patient's PMHx is remarkable for GERD and Hearing loss  Hold all slr based hamstring stretching and active slr flexion based movements due to creating peripheralization        Manuals 4/19 4/24 4/26 5/1 5/3 5/8 5/10 5/15 5/17 5/22 5/26      Lumbar spine extension mobilization in prone grade 1&2  10 min 10 min  NT 10 min 10 min  10 min  10 min 10 min  NT                       TRIAL  Rot mob                                   Manual traction   Prone                                   Neuro Re-Ed                                  Ther Ex                                  Treadmill walking  10 min 10 min 2 0 to 2 5 mph  10 min 10 min 10 min      10 MIN          10 min 10 min 10 min 10 MIN                            R LE 90/90 floss    10x 2  20X     20X  resume next visit resume today 2x10 20X       LTR:B:  10 sec x 5 NT NT 10 sec x 5  20X 3SEC  10 sec x 5 10 sec x 5 10 sec x 5 10SEC 10X                        Prone sidebend lying       NT NT NT  NT                       Side glides        NT NT NT  NT                       Prone side bend   2 min  NT   NT NT NT  NT      Piriformis stretch:B:  10 sec x 5 20SEC 5X  20SEC 5X  10 sec x 5 10 sec x 5 20SEC 5X  20 sec x 5 20 sec x 5 20 sec x 5 20SEC 3X       DLS abdominal strengthening in hook lying  NT NT NT NT NT 20X  2 x 10 NT  NT      DLS abdominal strengthening in hook lying with hip flexion:B:  NT NT 20X 2 x 10 NT 20X  2 x 10 NT  NT      DLS abdominal strengthening in hook lying with slr flexion:B:  NT NT 20X NT NT NT NT Hold! NT      DLS opposite upper extremity to lower extremity  NT NT NT NT NT NT NT NT  NT      Bridges  NT NT 20X  2 x 10 2 x 10 20x  2 x 10   20X                                                                           Prone lying 2 min hep 2 min 2 MIN X2  3 MIN   3X  3 min with 1 pillow 3 min with 1 pillow, 3 min without pillow 3 MIN     1 PILLOW  3 min 2 pillows under shoulders / chest 3 min 2 pillows under shoulders / chest 3 min 2 pillows under shoulders / chest  2 MIN     2 pillows                        Prone on wedge     NT NT NT NT NT NT NT NT                       Prone press ups 2 x 10 hep 2 x 10  10X 5 NT 10 x 5 10 x pre and post treatment session 20X  10 x pre session and post session  10 x pre and post session 10 x pre and post session 30X       Prone press ups with lock and sag 10 x hep 2 x 10 10X 2 NT NT 10 x 2 pre and post PT treatment session 20X  2 x 10 2 x 10 pre session 2 x 10 pre session 30X       Prone press ups with clinician over pressure  5 x 2 NT NT NT  assess hold NT  20X       Mini squats with DLS   NT NT NT NT NT NT NT 2x10 20X       Lunges with DLS:B:   NT NT NT NT NT NT NT assess NT                       [place] rows and extension:B:   NT NT NT NT NT NT NT 2x10 ea 11# 20X 12#                        [place] [employer]   NT NT NT NT NT NT NT  12# 20X                                                                          Ther Activity                                                   Gait Training                                                   Modalities                 MHP and TENS to lumbar spine while prone prn  CP to lumbar spine prone with two pillows under chest x 10 min With   CP     Prone with   Blue wedge   10 min  TENS   MHP    Prone   Laying     10 min  deferred   CP x 10 min prone CP   And TENS     To LB   Prone  MHP only x 10 min prone  deferred  defered

## 2023-05-31 ENCOUNTER — EVALUATION (OUTPATIENT)
Dept: PHYSICAL THERAPY | Age: 38
End: 2023-05-31

## 2023-05-31 DIAGNOSIS — M51.27 HERNIATION OF INTERVERTEBRAL DISC BETWEEN L5 AND S1: Primary | ICD-10-CM

## 2023-05-31 DIAGNOSIS — M54.17 LUMBOSACRAL RADICULOPATHY AT S1: ICD-10-CM

## 2023-05-31 NOTE — LETTER
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  M51 27       2  Lumbosacral radiculopathy at S1  M54 17           Dear Dr Tye Knutson: Thank you for your recent referral of Milagros Osman  Please review the attached evaluation summary from Horacio's recent visit  Please verify that you agree with the plan of care by signing the attached order  If you have any questions or concerns, please do not hesitate to call  I sincerely appreciate the opportunity to share in the care of one of your patients and hope to have another opportunity to work with you in the near future  Sincerely,    Olman Ott, PT      Referring Provider:      I certify that I have read the below Plan of Care and certify the need for these services furnished under this plan of treatment while under my care  Rony Wolfe MD  [id number]  [facility]  [address]  [phone]  [phone]  Via Fax: [phone]          Daily Note     Today's date: 2023  Patient name: Milagros Osman  : 1985  MRN: [id number]  Referring provider: Tray Salinas MD  Dx:   Encounter Diagnosis     ICD-10-CM    1  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  M51 27       2  Lumbosacral radiculopathy at S1  M54 17                      Subjective: Pt reported \"I over did it over the weekend being very busy my back is sore but I had no symptoms on my foot\"    Objective: See treatment diary below      Assessment: Good tolerance to exercises  Plan: D/C to HEP       Precautions: Patient's PMHx is remarkable for GERD and Hearing loss  Hold all slr based hamstring stretching and active slr flexion based movements due to creating peripheralization        Manuals  5/3 5/8 5/10 5/15 5/17 5/22 5/26 5/31   " Lumbar spine extension mobilization in prone grade 1&2  10 min 10 min  NT 10 min 10 min  10 min  10 min 10 min  NT NT                      TRIAL  Rot mob                                   Manual traction   Prone                                   Neuro Re-Ed                                  Ther Ex                                  Treadmill walking  10 min 10 min 2 0 to 2 5 mph  10 min 10 min 10 min      10 MIN          10 min 10 min 10 min 10 MIN      10 MIN     2 0 MPH                      R LE 90/90 floss    10x 2  20X     20X  resume next visit resume today 2x10 20X  20X      LTR:B:  10 sec x 5 NT NT 10 sec x 5  20X 3SEC  10 sec x 5 10 sec x 5 10 sec x 5 10SEC 10X  10SEC   10X                       Prone sidebend lying       NT NT NT  NT NT                      Side glides        NT NT NT  NT NT                      Prone side bend   2 min  NT   NT NT NT  NT NT     Piriformis stretch:B:  10 sec x 5 20SEC 5X  20SEC 5X  10 sec x 5 10 sec x 5 20SEC 5X  20 sec x 5 20 sec x 5 20 sec x 5 20SEC 3X  20SEC 5X      DLS abdominal strengthening in hook lying  NT NT NT NT NT 20X  2 x 10 NT  NT NT     DLS abdominal strengthening in hook lying with hip flexion:B:  NT NT 20X 2 x 10 NT 20X  2 x 10 NT  NT NT     DLS abdominal strengthening in hook lying with slr flexion:B:  NT NT 20X NT NT NT NT Hold!   NT NT     DLS opposite upper extremity to lower extremity  NT NT NT NT NT NT NT NT  NT NT     Bridges  NT NT 20X  2 x 10 2 x 10 20x  2 x 10   20X  20X 3SEC                                                                          Prone lying 2 min hep 2 min 2 MIN X2  3 MIN   3X  3 min with 1 pillow 3 min with 1 pillow, 3 min without pillow 3 MIN     1 PILLOW  3 min 2 pillows under shoulders / chest 3 min 2 pillows under shoulders / chest 3 min 2 pillows under shoulders / chest  2 MIN     2 pillows  2 MIN       2 PILLOWS                       Prone on wedge     NT NT NT NT NT NT NT NT NT                      Prone press ups 2 x 10 hep 2 x 10  10X 5 NT 10 x 5 10 x pre and post treatment session 20X  10 x pre session and post session  10 x pre and post session 10 x pre and post session 30X  30X      Prone press ups with lock and sag 10 x hep 2 x 10 10X 2 NT NT 10 x 2 pre and post PT treatment session 20X  2 x 10 2 x 10 pre session 2 x 10 pre session 30X  30X      Prone press ups with clinician over pressure  5 x 2 NT NT NT  assess hold NT  20X  20X      Mini squats with DLS   NT NT NT NT NT NT NT 2x10 20X  20X      Lunges with DLS:B:   NT NT NT NT NT NT NT assess NT NT                      [place] rows and extension:B:   NT NT NT NT NT NT NT 2x10 ea 11# 20X 12#  22# 2X                       Kwaku Paloff Press   NT NT NT NT NT NT NT  12# 20X 12# 20X                                                                          Ther Activity                                                   Gait Training                                                   Modalities                 MHP and TENS to lumbar spine while prone prn  CP to lumbar spine prone with two pillows under chest x 10 min With   CP     Prone with   Blue wedge   10 min  TENS   MHP    Prone   Laying     10 min  deferred  CP x 10 min prone CP   And TENS     To LB   Prone  MHP only x 10 min prone  deferred  defered  deferend                                    Attestation signed by Michael Ott PT at 2023 12:39 PM:  I supervised the visit  We discussed the case to ensure appropriate continuation and progression of care and I reviewed the documentation  PT Evaluation  / PT Reassessment / PT Discharge  Today's date: 2023  Patient name: Alicia Tong  : 1985  MRN: [id number]  Referring provider: Baldev Peterson MD  Dx:   Encounter Diagnosis     ICD-10-CM    1  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  M51 27       2   Lumbosacral radiculopathy at S1  M54 17           Start Time: 1100  Stop Time: 1210  Total time in clinic (min): 70 minutes    Assessment  Assessment details: PT Reassessment: 05/31/2023  Patient presents with the following progress since onset of PT: no longer having right lower extremity numbness, no longer having right lower extremity weakness, right lower extremity strength, - right lower extremity symptoms, lumbar spine mobility, ability to resume self and house hold functional activities  Patient reported repeated lumbar spine flexion, bending and lifting activities remain limited by lumbar spine pinching sensation, lumbar spine muscle tightness / guarding and apprehension during house hold and lifting based functional activities  PT Reassessment: 05/15/23  Patient reported the following progress since onset of PT: no longer having right distal lower extremity numbness, sitting, driving improved and no longer having lumbar spine muscle spasms  But, he noted the following deficits that still persists: intermittent lumbar spine pinching sensation with prolonged sitting and awkward posture, bending, lifting activities > 20# stair climbing and dressing right lower extremity  PT IE: 04/19/23  Patient reported right lumbar spine and low back, along with new symptoms after twisting his lumbar spine which is half of foot numbness, ankle weakness, tightness of the gastrocnemius / posterior knee region  Plus, he noted when he bends forwards his right lower extremity symptoms are worsened  Patient noted   Patient noted L5/S1severe HNP  Patient noted his prior injury was after a fall, many years ago  Patient noted he is looking forward to not having surgery  Patient noted his right lower extremity is weaker  Patient noted he has the posterior of his foot is on fire  Patient noted he has the following deficits due to lumbar spine and right le symptoms: dressing, tying his shoe, lifting, bending, sitting, squatting, sleep deprived with prone and right side lying positions pain aggravating    Patient reported walking and standing are better  Patient noted all bending activities are limited by right lower extremity symptoms aggravation  Patient noted sitting in his car is pain aggravating  Patient noted his right lower extremity is weaker  Patient noted when this first happened, the twisting episode, he went to the ED  Patient noted he has trailed dexamethasone, naproxen and a muscle relaxant which has helped a little, but not long term  Patient noted right foot numbness since twisting was been constant  Impairments: abnormal gait, abnormal or restricted ROM, abnormal movement, activity intolerance, impaired physical strength, lacks appropriate home exercise program and pain with function  Understanding of Dx/Px/POC: excellent   Prognosis: good  Prognosis details: Patient is a 42y o  year old male seen for outpatient PT reevaluation with pain, mobility and functional deficits due to lumbar radiculopathy and HNP at L5 S1  Patient preesnts with the following progress since onset of PT: decrease in lumbar spine and right distal lower extremity pain and paresthesias, decrease in muscle guarding, ambulation, lumbar spine mobility, right lower extremity mobility and strength and functional progress  Thus, due to functional and impairment progress patient agrees to d/c to hep  Thank you for the referral      Goals  Short Term goals - 4 weeks  1  Patient will be independent HEP  MET  2   Patient will report a 25 - 50% decrease in pain complaints  MET  3   Increase strength 1/2 grade  MET  4   Increase ROM 5-10 degrees  MET  Long Term goals - 8 weeks  1  Patient will report elimination of pain complaints  Partially MET  2   Patient will return to all work related activities without restriction  MET  3   Patient will return to all recreational activities without restriction  MET  4   ROM WFL  MET  5   Strength 5/5  MET   6   Patient will report ability to perform right distal lower extremity dressing and shoe donning and doffing without lumbar spine or left lower extremity symptoms or limitations  MET  7   Patient will deny walking and stair climbing deficits  MET  8   Patient will deny transfer deficits  MET  9   Patient will report ability to resume house hold lifting and bending based functional activities without lumbar spine or right lower extremity symptoms or limitations  MET   10   Patient will report ability to sit for > 15 min prior to lumbar spine or right lower extremity symptoms or limitations  MET  11   Patient will report sleep improved by > 30 minutes prior to lumbar spine or right lower extremity symptoms or limitations  MET  Plan  Patient would benefit from: skilled physical therapy  Planned modality interventions: cryotherapy, TENS, thermotherapy: hydrocollator packs, traction, ultrasound and unattended electrical stimulation  Planned therapy interventions: joint mobilization, manual therapy, massage, balance, balance/weight bearing training, body mechanics training, patient education, postural training, muscle pump exercises, neuromuscular re-education, compression, self care, strengthening, stretching, therapeutic activities, therapeutic exercise, therapeutic training, transfer training, flexibility, functional ROM exercises, gait training, graded activity, graded exercise, graded motor and home exercise program  Frequency: 2x week  Duration in weeks: 8  Treatment plan discussed with: patient        Subjective Evaluation    History of Present Illness  Mechanism of injury: Patient's PMHx is remarkable for GERD, Right ankle ORIF and Hearing loss    Pain  At best pain ratin  At worst pain ratin  Location: right lumbar spine region     Patient Goals  Patient goals for therapy: decreased pain, increased motion, increased strength, independence with ADLs/IADLs and return to sport/leisure activities          Objective     Tenderness     Additional Tenderness Details  Patient was - TTP but + minimal muscle guarding at lumbar spine erector spinae musculature  Patient exhibited a decrease in lumbar spine lordosis due to severe muscle guarding  Active Range of Motion     Lumbar   Flexion: 80 degrees   Extension: 32 degrees   Left lateral flexion: 20 degrees    with pain  Right lateral flexion: 20 degrees  with pain  Left rotation: 60 degrees   Right rotation: 64 degrees   Left Hip   Flexion: 116 degrees     Right Hip   Flexion: 112 degrees     Additional Active Range of Motion Details  Hamstring mobility on right at 36 degrees and left at 40 degrees  Mechanical Assessment    Cervical      Thoracic      Lumbar    Sitting flexion:   Pain location: peripheralized  Pain intensity: worse  Pain level: increased  Standing extension: repeated movements  Pain location: peripheralized  Pain intensity: worse  Pain level: increased  Lying extension: repeated movements  Pain intensity: better  Pain level: decreased    Strength/Myotome Testing     Left Hip   Planes of Motion   Flexion: 5  Extension: 5  Abduction: 5  Adduction: 5    Right Hip   Planes of Motion   Flexion: 5  Extension: 5  Abduction: 5  Adduction: 5    Left Knee   Flexion: 5  Extension: 5    Right Knee   Flexion: 5  Extension: 5    Left Ankle/Foot   Dorsiflexion: 5  Plantar flexion: 5    Right Ankle/Foot   Dorsiflexion: 5  Plantar flexion: 5    Tests     Lumbar     Left   Positive crossed SLR  Right   Positive passive SLR and slump test      Right Pelvic Girdle/Sacrum   Positive: active SLR test      Right Hip   SLR: Positive  Ambulation     Ambulation: Level Surfaces   Ambulation without assistive device: independent    Additional Level Surfaces Ambulation Details  Patient ambulates with normal gait pattern      Ambulation: Stairs   Ascend stairs: independent  Pattern: reciprocal  Railings: two rails  Descend stairs: independent  Pattern: reciprocal  Railings: two rails                 Precautions: Patient's PMHx is remarkable for GERD and Hearing loss       Manuals 4/19 4/24 4/26 5/1 5/3 5/8 5/10 5/15     Lumbar spine extension mobilization in prone grade 1&2  10 min 10 min  NT 10 min 10 min  10 min  10 min                  TRIAL  Rot mob                           Manual traction   Prone                           Neuro Re-Ed                          Ther Ex                          Treadmill walking  10 min 10 min 2 0 to 2 5 mph  10 min 10 min 10 min      10 MIN          10 min                  R LE 90/90 floss    10x 2  20X     20X  resume next visit resume today    Hamstring stretch:B:  20 sec x 5 20sec 5x  NT 10 sec x 5  HOLD Hold D/C    LTR:B:  10 sec x 5 NT NT 10 sec x 5  20X 3SEC  10 sec x 5                  Prone sidebend lying       NT NT                  Side glides        NT NT                  Prone side bend   2 min  NT   NT NT     Piriformis stretch:B:  10 sec x 5 20SEC 5X  20SEC 5X  10 sec x 5 10 sec x 5 20SEC 5X  20 sec x 5     DLS abdominal strengthening in hook lying  NT NT NT NT NT 20X  2 x 10     DLS abdominal strengthening in hook lying with hip flexion:B:  NT NT 20X 2 x 10 NT 20X  2 x 10     DLS abdominal strengthening in hook lying with slr flexion:B:  NT NT 20X NT NT NT NT     DLS opposite upper extremity to lower extremity  NT NT NT NT NT NT NT     Bridges  NT NT 20X  2 x 10 2 x 10 20x  2 x 10                                                         Prone lying 2 min hep 2 min 2 MIN X2  3 MIN   3X  3 min with 1 pillow 3 min with 1 pillow, 3 min without pillow 3 MIN     1 PILLOW  3 min 2 pillows under shoulders / chest     Prone with 1 pillow     2 MIN 2X  2 min with 2 pillow and 2 min with 3 pillows  2 MIN     No pillow  NT                  Prone on wedge     NT NT NT NT NT                  Prone press ups 2 x 10 hep 2 x 10  10X 5 NT 10 x 5 10 x pre and post treatment session 20X  10 x pre session and post session      Prone press ups with lock and sag 10 x hep 2 x 10 10X 2 NT NT 10 x 2 pre and post PT treatment session 20X  2 x 10 Prone press ups with clinician over pressure  5 x 2 NT NT NT  assess hold     Mini squats with DLS   NT NT NT NT NT NT     Lunges with DLS:B:   NT NT NT NT NT NT                  [place] rows and extension:B:   NT NT NT NT NT NT                  Kwaku Paloff Press   NT NT NT NT NT NT                                                         Ther Activity                                       Gait Training                                       Modalities             MHP and TENS to lumbar spine while prone prn  CP to lumbar spine prone with two pillows under chest x 10 min With   CP     Prone with   Blue wedge   10 min  TENS   MHP    Prone   Laying     10 min  deferred   CP x 10 min prone CP   And TENS     To LB   Prone  CP and Zynex TENS x 10 min prone

## 2023-05-31 NOTE — PROGRESS NOTES
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  M51 27       2  Lumbosacral radiculopathy at S1  M54 17           Start Time: 1100  Stop Time: 1210  Total time in clinic (min): 70 minutes    Assessment  Assessment details: PT Reassessment: 2023  Patient presents with the following progress since onset of PT: no longer having right lower extremity numbness, no longer having right lower extremity weakness, right lower extremity strength, - right lower extremity symptoms, lumbar spine mobility, ability to resume self and house hold functional activities  Patient reported repeated lumbar spine flexion, bending and lifting activities remain limited by lumbar spine pinching sensation, lumbar spine muscle tightness / guarding and apprehension during house hold and lifting based functional activities  PT Reassessment: 05/15/23  Patient reported the following progress since onset of PT: no longer having right distal lower extremity numbness, sitting, driving improved and no longer having lumbar spine muscle spasms  But, he noted the following deficits that still persists: intermittent lumbar spine pinching sensation with prolonged sitting and awkward posture, bending, lifting activities > 20# stair climbing and dressing right lower extremity  PT IE: 23  Patient reported right lumbar spine and low back, along with new symptoms after twisting his lumbar spine which is half of foot numbness, ankle weakness, tightness of the gastrocnemius / posterior knee region  Plus, he noted when he bends forwards his right lower extremity symptoms are worsened  Patient noted   Patient noted L5/S1severe HNP  Patient noted his prior injury was after a fall, many years ago    Patient noted he is looking forward to not having surgery  Patient noted his right lower extremity is weaker  Patient noted he has the posterior of his foot is on fire  Patient noted he has the following deficits due to lumbar spine and right le symptoms: dressing, tying his shoe, lifting, bending, sitting, squatting, sleep deprived with prone and right side lying positions pain aggravating  Patient reported walking and standing are better  Patient noted all bending activities are limited by right lower extremity symptoms aggravation  Patient noted sitting in his car is pain aggravating  Patient noted his right lower extremity is weaker  Patient noted when this first happened, the twisting episode, he went to the ED  Patient noted he has trailed dexamethasone, naproxen and a muscle relaxant which has helped a little, but not long term  Patient noted right foot numbness since twisting was been constant  Impairments: abnormal gait, abnormal or restricted ROM, abnormal movement, activity intolerance, impaired physical strength, lacks appropriate home exercise program and pain with function  Understanding of Dx/Px/POC: excellent   Prognosis: good  Prognosis details: Patient is a 42y o  year old male seen for outpatient PT reevaluation with pain, mobility and functional deficits due to lumbar radiculopathy and HNP at L5 S1  Patient preesnts with the following progress since onset of PT: decrease in lumbar spine and right distal lower extremity pain and paresthesias, decrease in muscle guarding, ambulation, lumbar spine mobility, right lower extremity mobility and strength and functional progress  Thus, due to functional and impairment progress patient agrees to d/c to hep  Thank you for the referral      Goals  Short Term goals - 4 weeks  1  Patient will be independent HEP  MET  2   Patient will report a 25 - 50% decrease in pain complaints  MET  3   Increase strength 1/2 grade  MET  4   Increase ROM 5-10 degrees  MET     Long Term goals - 8 weeks  1  Patient will report elimination of pain complaints  Partially MET  2   Patient will return to all work related activities without restriction  MET  3   Patient will return to all recreational activities without restriction  MET  4   ROM WFL  MET  5   Strength 5/5  MET   6   Patient will report ability to perform right distal lower extremity dressing and shoe donning and doffing without lumbar spine or left lower extremity symptoms or limitations  MET  7   Patient will deny walking and stair climbing deficits  MET  8   Patient will deny transfer deficits  MET  9   Patient will report ability to resume house hold lifting and bending based functional activities without lumbar spine or right lower extremity symptoms or limitations  MET   10   Patient will report ability to sit for > 15 min prior to lumbar spine or right lower extremity symptoms or limitations  MET  11   Patient will report sleep improved by > 30 minutes prior to lumbar spine or right lower extremity symptoms or limitations  MET      Plan  Patient would benefit from: skilled physical therapy  Planned modality interventions: cryotherapy, TENS, thermotherapy: hydrocollator packs, traction, ultrasound and unattended electrical stimulation  Planned therapy interventions: joint mobilization, manual therapy, massage, balance, balance/weight bearing training, body mechanics training, patient education, postural training, muscle pump exercises, neuromuscular re-education, compression, self care, strengthening, stretching, therapeutic activities, therapeutic exercise, therapeutic training, transfer training, flexibility, functional ROM exercises, gait training, graded activity, graded exercise, graded motor and home exercise program  Frequency: 2x week  Duration in weeks: 8  Treatment plan discussed with: patient        Subjective Evaluation    History of Present Illness  Mechanism of injury: Patient's PMHx is remarkable for GERD, Right ankle ORIF and Hearing loss  Pain  At best pain ratin  At worst pain ratin  Location: right lumbar spine region     Patient Goals  Patient goals for therapy: decreased pain, increased motion, increased strength, independence with ADLs/IADLs and return to sport/leisure activities          Objective     Tenderness     Additional Tenderness Details  Patient was - TTP but + minimal muscle guarding at lumbar spine erector spinae musculature  Patient exhibited a decrease in lumbar spine lordosis due to severe muscle guarding  Active Range of Motion     Lumbar   Flexion: 80 degrees   Extension: 32 degrees   Left lateral flexion: 20 degrees    with pain  Right lateral flexion: 20 degrees  with pain  Left rotation: 60 degrees   Right rotation: 64 degrees   Left Hip   Flexion: 116 degrees     Right Hip   Flexion: 112 degrees     Additional Active Range of Motion Details  Hamstring mobility on right at 36 degrees and left at 40 degrees  Mechanical Assessment    Cervical      Thoracic      Lumbar    Sitting flexion:   Pain location: peripheralized  Pain intensity: worse  Pain level: increased  Standing extension: repeated movements  Pain location: peripheralized  Pain intensity: worse  Pain level: increased  Lying extension: repeated movements  Pain intensity: better  Pain level: decreased    Strength/Myotome Testing     Left Hip   Planes of Motion   Flexion: 5  Extension: 5  Abduction: 5  Adduction: 5    Right Hip   Planes of Motion   Flexion: 5  Extension: 5  Abduction: 5  Adduction: 5    Left Knee   Flexion: 5  Extension: 5    Right Knee   Flexion: 5  Extension: 5    Left Ankle/Foot   Dorsiflexion: 5  Plantar flexion: 5    Right Ankle/Foot   Dorsiflexion: 5  Plantar flexion: 5    Tests     Lumbar     Left   Positive crossed SLR  Right   Positive passive SLR and slump test      Right Pelvic Girdle/Sacrum   Positive: active SLR test      Right Hip   SLR: Positive       Ambulation Ambulation: Level Surfaces   Ambulation without assistive device: independent    Additional Level Surfaces Ambulation Details  Patient ambulates with normal gait pattern  Ambulation: Stairs   Ascend stairs: independent  Pattern: reciprocal  Railings: two rails  Descend stairs: independent  Pattern: reciprocal  Railings: two rails                  Precautions: Patient's PMHx is remarkable for GERD and Hearing loss        Manuals 4/19 4/24 4/26 5/1 5/3 5/8 5/10 5/15     Lumbar spine extension mobilization in prone grade 1&2  10 min 10 min  NT 10 min 10 min  10 min  10 min                  TRIAL  Rot mob                           Manual traction   Prone                           Neuro Re-Ed                          Ther Ex                          Treadmill walking  10 min 10 min 2 0 to 2 5 mph  10 min 10 min 10 min      10 MIN          10 min                  R LE 90/90 floss    10x 2  20X     20X  resume next visit resume today    Hamstring stretch:B:  20 sec x 5 20sec 5x  NT 10 sec x 5  HOLD Hold D/C    LTR:B:  10 sec x 5 NT NT 10 sec x 5  20X 3SEC  10 sec x 5                  Prone sidebend lying       NT NT                  Side glides        NT NT                  Prone side bend   2 min  NT   NT NT     Piriformis stretch:B:  10 sec x 5 20SEC 5X  20SEC 5X  10 sec x 5 10 sec x 5 20SEC 5X  20 sec x 5     DLS abdominal strengthening in hook lying  NT NT NT NT NT 20X  2 x 10     DLS abdominal strengthening in hook lying with hip flexion:B:  NT NT 20X 2 x 10 NT 20X  2 x 10     DLS abdominal strengthening in hook lying with slr flexion:B:  NT NT 20X NT NT NT NT     DLS opposite upper extremity to lower extremity  NT NT NT NT NT NT NT     Bridges  NT NT 20X  2 x 10 2 x 10 20x  2 x 10                                                         Prone lying 2 min hep 2 min 2 MIN X2  3 MIN   3X  3 min with 1 pillow 3 min with 1 pillow, 3 min without pillow 3 MIN     1 PILLOW  3 min 2 pillows under shoulders / chest Prone with 1 pillow     2 MIN 2X  2 min with 2 pillow and 2 min with 3 pillows  2 MIN     No pillow  NT                  Prone on wedge     NT NT NT NT NT                  Prone press ups 2 x 10 hep 2 x 10  10X 5 NT 10 x 5 10 x pre and post treatment session 20X  10 x pre session and post session      Prone press ups with lock and sag 10 x hep 2 x 10 10X 2 NT NT 10 x 2 pre and post PT treatment session 20X  2 x 10     Prone press ups with clinician over pressure  5 x 2 NT NT NT  assess hold     Mini squats with DLS   NT NT NT NT NT NT     Lunges with DLS:B:   NT NT NT NT NT NT                  [place] rows and extension:B:   NT NT NT NT NT NT                  [place] [employer]   NT NT NT NT NT NT                                                         Ther Activity                                       Gait Training                                       Modalities             MHP and TENS to lumbar spine while prone prn  CP to lumbar spine prone with two pillows under chest x 10 min With   CP     Prone with   Blue wedge   10 min  TENS   MHP    Prone   Laying     10 min  deferred   CP x 10 min prone CP   And TENS     To LB   Prone  CP and Zynex TENS x 10 min prone

## 2023-05-31 NOTE — PROGRESS NOTES
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  M51 27       2  Lumbosacral radiculopathy at S1  M54 17                      Subjective: Pt reported \"I over did it over the weekend being very busy my back is sore but I had no symptoms on my foot\"    Objective: See treatment diary below      Assessment: Good tolerance to exercises  Plan: D/C to HEP        Precautions: Patient's PMHx is remarkable for GERD and Hearing loss  Hold all slr based hamstring stretching and active slr flexion based movements due to creating peripheralization        Manuals 4/19 4/24 4/26 5/1 5/3 5/8 5/10 5/15 5/17 5/22 5/26 5/31     Lumbar spine extension mobilization in prone grade 1&2  10 min 10 min  NT 10 min 10 min  10 min  10 min 10 min  NT NT                      TRIAL  Rot mob                                   Manual traction   Prone                                   Neuro Re-Ed                                  Ther Ex                                  Treadmill walking  10 min 10 min 2 0 to 2 5 mph  10 min 10 min 10 min      10 MIN          10 min 10 min 10 min 10 MIN      10 MIN     2 0 MPH                      R LE 90/90 floss    10x 2  20X     20X  resume next visit resume today 2x10 20X  20X      LTR:B:  10 sec x 5 NT NT 10 sec x 5  20X 3SEC  10 sec x 5 10 sec x 5 10 sec x 5 10SEC 10X  10SEC   10X                       Prone sidebend lying       NT NT NT  NT NT                      Side glides        NT NT NT  NT NT                      Prone side bend   2 min  NT   NT NT NT  NT NT     Piriformis stretch:B:  10 sec x 5 20SEC 5X  20SEC 5X  10 sec x 5 10 sec x 5 20SEC 5X  20 sec x 5 20 sec x 5 20 sec x 5 20SEC 3X  20SEC 5X      DLS abdominal strengthening in hook lying  NT NT NT NT NT 20X  2 x 10 NT  NT NT     DLS abdominal " strengthening in hook lying with hip flexion:B:  NT NT 20X 2 x 10 NT 20X  2 x 10 NT  NT NT     DLS abdominal strengthening in hook lying with slr flexion:B:  NT NT 20X NT NT NT NT Hold!   NT NT     DLS opposite upper extremity to lower extremity  NT NT NT NT NT NT NT NT  NT NT     Bridges  NT NT 20X  2 x 10 2 x 10 20x  2 x 10   20X  20X 3SEC                                                                          Prone lying 2 min hep 2 min 2 MIN X2  3 MIN   3X  3 min with 1 pillow 3 min with 1 pillow, 3 min without pillow 3 MIN     1 PILLOW  3 min 2 pillows under shoulders / chest 3 min 2 pillows under shoulders / chest 3 min 2 pillows under shoulders / chest  2 MIN     2 pillows  2 MIN       2 PILLOWS                       Prone on wedge     NT NT NT NT NT NT NT NT NT                      Prone press ups 2 x 10 hep 2 x 10  10X 5 NT 10 x 5 10 x pre and post treatment session 20X  10 x pre session and post session  10 x pre and post session 10 x pre and post session 30X  30X      Prone press ups with lock and sag 10 x hep 2 x 10 10X 2 NT NT 10 x 2 pre and post PT treatment session 20X  2 x 10 2 x 10 pre session 2 x 10 pre session 30X  30X      Prone press ups with clinician over pressure  5 x 2 NT NT NT  assess hold NT  20X  20X      Mini squats with DLS   NT NT NT NT NT NT NT 2x10 20X  20X      Lunges with DLS:B:   NT NT NT NT NT NT NT assess NT NT                      [place] rows and extension:B:   NT NT NT NT NT NT NT 2x10 ea 11# 20X 12#  22# 2X                       [place] [employer]   NT NT NT NT NT NT NT  12# 20X 12# 20X                                                                          Ther Activity                                                   Gait Training                                                   Modalities                 MHP and TENS to lumbar spine while prone prn  CP to lumbar spine prone with two pillows under chest x 10 min With   CP     Prone with   Blue wedge   10 min  TENS MHP    Prone   Laying     10 min  deferred   CP x 10 min prone CP   And TENS     To LB   Prone  MHP only x 10 min prone  deferred  defered  deferend